# Patient Record
Sex: MALE | Race: ASIAN | NOT HISPANIC OR LATINO | Employment: OTHER | URBAN - METROPOLITAN AREA
[De-identification: names, ages, dates, MRNs, and addresses within clinical notes are randomized per-mention and may not be internally consistent; named-entity substitution may affect disease eponyms.]

---

## 2023-08-05 ENCOUNTER — HOSPITAL ENCOUNTER (INPATIENT)
Facility: MEDICAL CENTER | Age: 68
LOS: 2 days | DRG: 291 | End: 2023-08-07
Attending: EMERGENCY MEDICINE | Admitting: STUDENT IN AN ORGANIZED HEALTH CARE EDUCATION/TRAINING PROGRAM
Payer: COMMERCIAL

## 2023-08-05 ENCOUNTER — APPOINTMENT (OUTPATIENT)
Dept: RADIOLOGY | Facility: MEDICAL CENTER | Age: 68
DRG: 291 | End: 2023-08-05
Attending: EMERGENCY MEDICINE
Payer: COMMERCIAL

## 2023-08-05 PROBLEM — R79.89 ELEVATED BRAIN NATRIURETIC PEPTIDE (BNP) LEVEL: Status: ACTIVE | Noted: 2023-08-05

## 2023-08-05 PROBLEM — R00.1 BRADYCARDIA: Status: ACTIVE | Noted: 2023-08-05

## 2023-08-05 PROBLEM — D64.9 ANEMIA: Status: ACTIVE | Noted: 2023-08-05

## 2023-08-05 PROBLEM — I48.91 AF (ATRIAL FIBRILLATION) (HCC): Status: ACTIVE | Noted: 2023-08-05

## 2023-08-05 PROBLEM — R06.02 SHORTNESS OF BREATH: Status: ACTIVE | Noted: 2023-08-05

## 2023-08-05 PROBLEM — N18.9 CKD (CHRONIC KIDNEY DISEASE): Status: ACTIVE | Noted: 2023-08-05

## 2023-08-05 LAB
ALBUMIN SERPL BCP-MCNC: 4.1 G/DL (ref 3.2–4.9)
ALBUMIN/GLOB SERPL: 1.6 G/DL
ALP SERPL-CCNC: 87 U/L (ref 30–99)
ALT SERPL-CCNC: 26 U/L (ref 2–50)
ANION GAP SERPL CALC-SCNC: 9 MMOL/L (ref 7–16)
AST SERPL-CCNC: 27 U/L (ref 12–45)
BASOPHILS # BLD AUTO: 0.6 % (ref 0–1.8)
BASOPHILS # BLD: 0.04 K/UL (ref 0–0.12)
BILIRUB SERPL-MCNC: 0.6 MG/DL (ref 0.1–1.5)
BUN SERPL-MCNC: 27 MG/DL (ref 8–22)
CALCIUM ALBUM COR SERPL-MCNC: 8.9 MG/DL (ref 8.5–10.5)
CALCIUM SERPL-MCNC: 9 MG/DL (ref 8.5–10.5)
CHLORIDE SERPL-SCNC: 107 MMOL/L (ref 96–112)
CO2 SERPL-SCNC: 24 MMOL/L (ref 20–33)
CREAT SERPL-MCNC: 1.69 MG/DL (ref 0.5–1.4)
D DIMER PPP IA.FEU-MCNC: 0.87 UG/ML (FEU) (ref 0–0.5)
EKG IMPRESSION: NORMAL
EKG IMPRESSION: NORMAL
EOSINOPHIL # BLD AUTO: 0.2 K/UL (ref 0–0.51)
EOSINOPHIL NFR BLD: 2.9 % (ref 0–6.9)
ERYTHROCYTE [DISTWIDTH] IN BLOOD BY AUTOMATED COUNT: 42.6 FL (ref 35.9–50)
FLUAV RNA SPEC QL NAA+PROBE: NEGATIVE
FLUBV RNA SPEC QL NAA+PROBE: NEGATIVE
GFR SERPLBLD CREATININE-BSD FMLA CKD-EPI: 44 ML/MIN/1.73 M 2
GLOBULIN SER CALC-MCNC: 2.6 G/DL (ref 1.9–3.5)
GLUCOSE SERPL-MCNC: 94 MG/DL (ref 65–99)
HCT VFR BLD AUTO: 37.4 % (ref 42–52)
HGB BLD-MCNC: 12.6 G/DL (ref 14–18)
IMM GRANULOCYTES # BLD AUTO: 0.02 K/UL (ref 0–0.11)
IMM GRANULOCYTES NFR BLD AUTO: 0.3 % (ref 0–0.9)
LYMPHOCYTES # BLD AUTO: 1.86 K/UL (ref 1–4.8)
LYMPHOCYTES NFR BLD: 27.4 % (ref 22–41)
MCH RBC QN AUTO: 30.7 PG (ref 27–33)
MCHC RBC AUTO-ENTMCNC: 33.7 G/DL (ref 32.3–36.5)
MCV RBC AUTO: 91 FL (ref 81.4–97.8)
MONOCYTES # BLD AUTO: 0.59 K/UL (ref 0–0.85)
MONOCYTES NFR BLD AUTO: 8.7 % (ref 0–13.4)
NEUTROPHILS # BLD AUTO: 4.07 K/UL (ref 1.82–7.42)
NEUTROPHILS NFR BLD: 60.1 % (ref 44–72)
NRBC # BLD AUTO: 0 K/UL
NRBC BLD-RTO: 0 /100 WBC (ref 0–0.2)
NT-PROBNP SERPL IA-MCNC: 2923 PG/ML (ref 0–125)
PLATELET # BLD AUTO: 287 K/UL (ref 164–446)
PMV BLD AUTO: 8.5 FL (ref 9–12.9)
POTASSIUM SERPL-SCNC: 3.9 MMOL/L (ref 3.6–5.5)
PROT SERPL-MCNC: 6.7 G/DL (ref 6–8.2)
RBC # BLD AUTO: 4.11 M/UL (ref 4.7–6.1)
RSV RNA SPEC QL NAA+PROBE: NEGATIVE
SARS-COV-2 RNA RESP QL NAA+PROBE: NOTDETECTED
SODIUM SERPL-SCNC: 140 MMOL/L (ref 135–145)
SPECIMEN SOURCE: NORMAL
TROPONIN T SERPL-MCNC: 17 NG/L (ref 6–19)
TROPONIN T SERPL-MCNC: 17 NG/L (ref 6–19)
WBC # BLD AUTO: 6.8 K/UL (ref 4.8–10.8)

## 2023-08-05 PROCEDURE — 700111 HCHG RX REV CODE 636 W/ 250 OVERRIDE (IP)

## 2023-08-05 PROCEDURE — 770020 HCHG ROOM/CARE - TELE (206)

## 2023-08-05 PROCEDURE — 0241U HCHG SARS-COV-2 COVID-19 NFCT DS RESP RNA 4 TRGT MIC: CPT

## 2023-08-05 PROCEDURE — 93005 ELECTROCARDIOGRAM TRACING: CPT | Performed by: EMERGENCY MEDICINE

## 2023-08-05 PROCEDURE — 83880 ASSAY OF NATRIURETIC PEPTIDE: CPT

## 2023-08-05 PROCEDURE — 85025 COMPLETE CBC W/AUTO DIFF WBC: CPT

## 2023-08-05 PROCEDURE — C9803 HOPD COVID-19 SPEC COLLECT: HCPCS | Performed by: EMERGENCY MEDICINE

## 2023-08-05 PROCEDURE — 36415 COLL VENOUS BLD VENIPUNCTURE: CPT

## 2023-08-05 PROCEDURE — 85379 FIBRIN DEGRADATION QUANT: CPT

## 2023-08-05 PROCEDURE — 93005 ELECTROCARDIOGRAM TRACING: CPT

## 2023-08-05 PROCEDURE — 84484 ASSAY OF TROPONIN QUANT: CPT

## 2023-08-05 PROCEDURE — 80053 COMPREHEN METABOLIC PANEL: CPT

## 2023-08-05 PROCEDURE — 71275 CT ANGIOGRAPHY CHEST: CPT

## 2023-08-05 PROCEDURE — 99285 EMERGENCY DEPT VISIT HI MDM: CPT

## 2023-08-05 PROCEDURE — 99222 1ST HOSP IP/OBS MODERATE 55: CPT | Mod: GC | Performed by: STUDENT IN AN ORGANIZED HEALTH CARE EDUCATION/TRAINING PROGRAM

## 2023-08-05 PROCEDURE — 700117 HCHG RX CONTRAST REV CODE 255: Performed by: EMERGENCY MEDICINE

## 2023-08-05 PROCEDURE — A9270 NON-COVERED ITEM OR SERVICE: HCPCS

## 2023-08-05 PROCEDURE — 700102 HCHG RX REV CODE 250 W/ 637 OVERRIDE(OP)

## 2023-08-05 PROCEDURE — 71045 X-RAY EXAM CHEST 1 VIEW: CPT

## 2023-08-05 RX ORDER — OXYCODONE HYDROCHLORIDE 5 MG/1
5 TABLET ORAL EVERY 4 HOURS PRN
COMMUNITY

## 2023-08-05 RX ORDER — ATORVASTATIN CALCIUM 10 MG/1
10 TABLET, FILM COATED ORAL DAILY
Status: DISCONTINUED | OUTPATIENT
Start: 2023-08-06 | End: 2023-08-07 | Stop reason: HOSPADM

## 2023-08-05 RX ORDER — ACETAMINOPHEN 325 MG/1
650 TABLET ORAL EVERY 6 HOURS PRN
Status: DISCONTINUED | OUTPATIENT
Start: 2023-08-05 | End: 2023-08-07 | Stop reason: HOSPADM

## 2023-08-05 RX ORDER — HYDROXYZINE HYDROCHLORIDE 10 MG/1
10-20 TABLET, FILM COATED ORAL 3 TIMES DAILY PRN
Status: ON HOLD | COMMUNITY
End: 2023-08-07

## 2023-08-05 RX ORDER — HYDRALAZINE HYDROCHLORIDE 20 MG/ML
20 INJECTION INTRAMUSCULAR; INTRAVENOUS EVERY 6 HOURS PRN
Status: DISCONTINUED | OUTPATIENT
Start: 2023-08-05 | End: 2023-08-07 | Stop reason: HOSPADM

## 2023-08-05 RX ORDER — HYDROXYZINE HYDROCHLORIDE 25 MG/1
25 TABLET, FILM COATED ORAL 3 TIMES DAILY PRN
Status: DISCONTINUED | OUTPATIENT
Start: 2023-08-05 | End: 2023-08-07 | Stop reason: HOSPADM

## 2023-08-05 RX ORDER — HEPARIN SODIUM 5000 [USP'U]/ML
5000 INJECTION, SOLUTION INTRAVENOUS; SUBCUTANEOUS EVERY 8 HOURS
Status: DISCONTINUED | OUTPATIENT
Start: 2023-08-05 | End: 2023-08-07 | Stop reason: HOSPADM

## 2023-08-05 RX ORDER — NIFEDIPINE 30 MG/1
60 TABLET, EXTENDED RELEASE ORAL DAILY
Status: ON HOLD | COMMUNITY
End: 2023-08-07

## 2023-08-05 RX ORDER — POLYETHYLENE GLYCOL 3350 17 G/17G
1 POWDER, FOR SOLUTION ORAL
Status: DISCONTINUED | OUTPATIENT
Start: 2023-08-05 | End: 2023-08-07 | Stop reason: HOSPADM

## 2023-08-05 RX ORDER — AMOXICILLIN 250 MG
2 CAPSULE ORAL 2 TIMES DAILY
Status: DISCONTINUED | OUTPATIENT
Start: 2023-08-05 | End: 2023-08-07 | Stop reason: HOSPADM

## 2023-08-05 RX ORDER — BISACODYL 10 MG
10 SUPPOSITORY, RECTAL RECTAL
Status: DISCONTINUED | OUTPATIENT
Start: 2023-08-05 | End: 2023-08-07 | Stop reason: HOSPADM

## 2023-08-05 RX ORDER — ASPIRIN 81 MG/1
81 TABLET ORAL DAILY
Status: DISCONTINUED | OUTPATIENT
Start: 2023-08-06 | End: 2023-08-07 | Stop reason: HOSPADM

## 2023-08-05 RX ORDER — CHOLECALCIFEROL (VITAMIN D3) 125 MCG
5 CAPSULE ORAL NIGHTLY
Status: DISCONTINUED | OUTPATIENT
Start: 2023-08-05 | End: 2023-08-07 | Stop reason: HOSPADM

## 2023-08-05 RX ORDER — OXYCODONE HYDROCHLORIDE 5 MG/1
5 TABLET ORAL EVERY 4 HOURS PRN
Status: DISCONTINUED | OUTPATIENT
Start: 2023-08-05 | End: 2023-08-07 | Stop reason: HOSPADM

## 2023-08-05 RX ORDER — ASPIRIN 81 MG/1
81 TABLET ORAL DAILY
COMMUNITY

## 2023-08-05 RX ORDER — CLOPIDOGREL BISULFATE 75 MG/1
75 TABLET ORAL DAILY
Status: DISCONTINUED | OUTPATIENT
Start: 2023-08-06 | End: 2023-08-07 | Stop reason: HOSPADM

## 2023-08-05 RX ORDER — ATENOLOL 50 MG/1
50 TABLET ORAL DAILY
Status: ON HOLD | COMMUNITY
End: 2023-08-07

## 2023-08-05 RX ORDER — ENOXAPARIN SODIUM 100 MG/ML
40 INJECTION SUBCUTANEOUS DAILY
Status: DISCONTINUED | OUTPATIENT
Start: 2023-08-05 | End: 2023-08-05

## 2023-08-05 RX ORDER — ATORVASTATIN CALCIUM 10 MG/1
10 TABLET, FILM COATED ORAL DAILY
COMMUNITY

## 2023-08-05 RX ORDER — CLOPIDOGREL BISULFATE 75 MG/1
75 TABLET ORAL DAILY
COMMUNITY

## 2023-08-05 RX ORDER — FUROSEMIDE 10 MG/ML
40 INJECTION INTRAMUSCULAR; INTRAVENOUS ONCE
Status: COMPLETED | OUTPATIENT
Start: 2023-08-05 | End: 2023-08-05

## 2023-08-05 RX ADMIN — FUROSEMIDE 40 MG: 10 INJECTION INTRAMUSCULAR; INTRAVENOUS at 22:52

## 2023-08-05 RX ADMIN — Medication 5 MG: at 22:52

## 2023-08-05 RX ADMIN — SENNOSIDES AND DOCUSATE SODIUM 2 TABLET: 50; 8.6 TABLET ORAL at 20:31

## 2023-08-05 RX ADMIN — HYDROXYZINE HYDROCHLORIDE 25 MG: 25 TABLET, FILM COATED ORAL at 22:52

## 2023-08-05 RX ADMIN — IOHEXOL 100 ML: 350 INJECTION, SOLUTION INTRAVENOUS at 18:02

## 2023-08-05 RX ADMIN — HEPARIN SODIUM 5000 UNITS: 5000 INJECTION, SOLUTION INTRAVENOUS; SUBCUTANEOUS at 22:52

## 2023-08-05 ASSESSMENT — ENCOUNTER SYMPTOMS
HEMOPTYSIS: 0
CLAUDICATION: 0
DIARRHEA: 0
ORTHOPNEA: 1
BLURRED VISION: 0
SHORTNESS OF BREATH: 1
SPUTUM PRODUCTION: 0
WHEEZING: 0
ABDOMINAL PAIN: 0
SORE THROAT: 0
DIZZINESS: 1
DEPRESSION: 0
VOMITING: 0
COUGH: 0
FEVER: 0
DOUBLE VISION: 0
CHILLS: 0

## 2023-08-05 ASSESSMENT — LIFESTYLE VARIABLES
HAVE PEOPLE ANNOYED YOU BY CRITICIZING YOUR DRINKING: NO
HOW MANY TIMES IN THE PAST YEAR HAVE YOU HAD 5 OR MORE DRINKS IN A DAY: 0
TOTAL SCORE: 0
TOTAL SCORE: 0
AVERAGE NUMBER OF DAYS PER WEEK YOU HAVE A DRINK CONTAINING ALCOHOL: 0
ON A TYPICAL DAY WHEN YOU DRINK ALCOHOL HOW MANY DRINKS DO YOU HAVE: 0
DOES PATIENT WANT TO STOP DRINKING: NO
CONSUMPTION TOTAL: NEGATIVE
EVER HAD A DRINK FIRST THING IN THE MORNING TO STEADY YOUR NERVES TO GET RID OF A HANGOVER: NO
ALCOHOL_USE: NO
HAVE YOU EVER FELT YOU SHOULD CUT DOWN ON YOUR DRINKING: NO
EVER FELT BAD OR GUILTY ABOUT YOUR DRINKING: NO
TOTAL SCORE: 0

## 2023-08-05 ASSESSMENT — PAIN DESCRIPTION - PAIN TYPE: TYPE: ACUTE PAIN

## 2023-08-05 ASSESSMENT — PATIENT HEALTH QUESTIONNAIRE - PHQ9
2. FEELING DOWN, DEPRESSED, IRRITABLE, OR HOPELESS: NOT AT ALL
1. LITTLE INTEREST OR PLEASURE IN DOING THINGS: NOT AT ALL
SUM OF ALL RESPONSES TO PHQ9 QUESTIONS 1 AND 2: 0

## 2023-08-05 ASSESSMENT — FIBROSIS 4 INDEX
FIB4 SCORE: 1.2
FIB4 SCORE: 1.2

## 2023-08-05 NOTE — ED TRIAGE NOTES
Sree Horace Ángel  67 y.o.  Male  Chief Complaint   Patient presents with    Shortness of Breath     SOB x a few days + fatigue + orthopnea. Also c/o low L sided rib pain & bilateral leg swelling. No cough/cold sx. Visiting from [place], [place].   Hx a fib, rate currently controlled but irregular. On atenolol & nifedipine.      Patient educated on triage process, to alert staff if any changes in condition.    Labs ordered. EKG done on arrival.

## 2023-08-05 NOTE — ED PROVIDER NOTES
ED Provider Note    CHIEF COMPLAINT  Chief Complaint   Patient presents with    Shortness of Breath     SOB x a few days + fatigue + orthopnea. Also c/o low L sided rib pain & rib swelling. No cough/cold sx. Visiting from [place], [place].   Hx a fib, rate currently controlled but irregular. On atenolol & nifedipine.        EXTERNAL RECORDS REVIEWED  No notes for review    HPI/ROS    OUTSIDE HISTORIAN(S):  Family patient's wife at bedside adding to history review of systems patient does not have a history of heart failure and has had severe weakness and shortness of breath the last 24 to 40 hours.  They have been here before this altitude never this severe symptoms.    Sree Neal is a 67 y.o. male who presents this is a pleasant 67-year-old male who presents with shortness of breath as well as chest pain.  He is from [place] and states that he has been here multiple times he has been out in the heat.  He states when he walks he feels extremely winded and short of breath and has chest pain.  He has a history of bradycardia and is on atenolol currently and states his heart rates can go down to high 40s.  The patient's chest pain is dull in nature, no radiation to his neck, his back to his arms and he was seeing the area factors.  Denies history of blood clots.  In addition states the last few days he has severe swelling of his lower extremities which is new for him.  He also states he has a history of atrial fibrillation but does not take blood thinners but does take Plavix as well as aspirin.    PAST MEDICAL HISTORY       SURGICAL HISTORY  patient denies any surgical history    FAMILY HISTORY  No family history on file.    SOCIAL HISTORY  Social History     Tobacco Use    Smoking status: Not on file    Smokeless tobacco: Not on file   Substance and Sexual Activity    Alcohol use: Not on file    Drug use: Not on file    Sexual activity: Not on file       CURRENT MEDICATIONS  Home Medications    **Home  medications have not yet been reviewed for this encounter**         ALLERGIES  No Known Allergies    PHYSICAL EXAM  VITAL SIGNS: BP (!) 147/77   Pulse (!) 39   Temp 36.6 °C (97.9 °F) (Temporal)   Resp 14   Ht 1.829 m (6')   Wt 107 kg (235 lb 14.3 oz)   SpO2 94%   BMI 31.99 kg/m²        Nursing notes and vitals reviewed.  Constitutional: Well developed, Well nourished, No acute distress, Non-toxic appearance.   Eyes: PERRLA, EOMI, Conjunctiva normal, No discharge.   HENT: Normocephalic, Atraumatic, moist mucous membranes, no facial edema Cardiovascular: Normal heart rate, Normal rhythm, No murmurs, No rubs, No gallops.   Thorax & Lungs: No respiratory distress, No rales, No rhonchi, No wheezing, No chest tenderness.   Abdomen: Bowel sounds normal, Soft, No tenderness, No guarding, No rebound, No masses, No pulsatile masses.   Skin: Warm, Dry, No erythema, No rash.   Extremities: No deformity, nonpitting pedal edema laterally, good range of motion range of motion upper lower extremes bilaterally  Neurologic: Alert & oriented x 3, no focal abnormalities noted, acting appropriately on examination  Psychiatric: Affect normal for clinical presentation.      DIAGNOSTIC STUDIES / PROCEDURES  EKG  I have independently interpreted this EKG  Atrial fibrillation with bradycardia  LABS  Results for orders placed or performed during the hospital encounter of 08/05/23   CBC with Differential   Result Value Ref Range    WBC 6.8 4.8 - 10.8 K/uL    RBC 4.11 (L) 4.70 - 6.10 M/uL    Hemoglobin 12.6 (L) 14.0 - 18.0 g/dL    Hematocrit 37.4 (L) 42.0 - 52.0 %    MCV 91.0 81.4 - 97.8 fL    MCH 30.7 27.0 - 33.0 pg    MCHC 33.7 32.3 - 36.5 g/dL    RDW 42.6 35.9 - 50.0 fL    Platelet Count 287 164 - 446 K/uL    MPV 8.5 (L) 9.0 - 12.9 fL    Neutrophils-Polys 60.10 44.00 - 72.00 %    Lymphocytes 27.40 22.00 - 41.00 %    Monocytes 8.70 0.00 - 13.40 %    Eosinophils 2.90 0.00 - 6.90 %    Basophils 0.60 0.00 - 1.80 %    Immature Granulocytes  0.30 0.00 - 0.90 %    Nucleated RBC 0.00 0.00 - 0.20 /100 WBC    Neutrophils (Absolute) 4.07 1.82 - 7.42 K/uL    Lymphs (Absolute) 1.86 1.00 - 4.80 K/uL    Monos (Absolute) 0.59 0.00 - 0.85 K/uL    Eos (Absolute) 0.20 0.00 - 0.51 K/uL    Baso (Absolute) 0.04 0.00 - 0.12 K/uL    Immature Granulocytes (abs) 0.02 0.00 - 0.11 K/uL    NRBC (Absolute) 0.00 K/uL   Complete Metabolic Panel (CMP)   Result Value Ref Range    Sodium 140 135 - 145 mmol/L    Potassium 3.9 3.6 - 5.5 mmol/L    Chloride 107 96 - 112 mmol/L    Co2 24 20 - 33 mmol/L    Anion Gap 9.0 7.0 - 16.0    Glucose 94 65 - 99 mg/dL    Bun 27 (H) 8 - 22 mg/dL    Creatinine 1.69 (H) 0.50 - 1.40 mg/dL    Calcium 9.0 8.5 - 10.5 mg/dL    Correct Calcium 8.9 8.5 - 10.5 mg/dL    AST(SGOT) 27 12 - 45 U/L    ALT(SGPT) 26 2 - 50 U/L    Alkaline Phosphatase 87 30 - 99 U/L    Total Bilirubin 0.6 0.1 - 1.5 mg/dL    Albumin 4.1 3.2 - 4.9 g/dL    Total Protein 6.7 6.0 - 8.2 g/dL    Globulin 2.6 1.9 - 3.5 g/dL    A-G Ratio 1.6 g/dL   Troponins NOW   Result Value Ref Range    Troponin T 17 6 - 19 ng/L   Troponins in two (2) hours   Result Value Ref Range    Troponin T 17 6 - 19 ng/L   ESTIMATED GFR   Result Value Ref Range    GFR (CKD-EPI) 44 (A) >60 mL/min/1.73 m 2   CoV-2, FLU A/B, and RSV by PCR (2-4 Hours CEPHEID) : Collect NP swab in VTM    Specimen: Respirate   Result Value Ref Range    Influenza virus A RNA Negative Negative    Influenza virus B, PCR Negative Negative    RSV, PCR Negative Negative    SARS-CoV-2 by PCR NotDetected     SARS-CoV-2 Source NP Swab    D-DIMER   Result Value Ref Range    D-Dimer 0.87 (H) 0.00 - 0.50 ug/mL (FEU)   proBrain Natriuretic Peptide, NT   Result Value Ref Range    NT-proBNP 2923 (H) 0 - 125 pg/mL   EKG   Result Value Ref Range    Report       [facility]    Test Date:  2023-08-05  Pt Name:    WILMER KENNEDY                   Department: ER  MRN:        [id number]                      Room:  Gender:     Male                          Technician: EDSFHR  :        [id number]                   Requested By:ER TRIAGE PROTOCOL  Order #:    [id number]                    Reading MD: PAULINA KULKARNI DO    Measurements  Intervals                                Axis  Rate:       47                           P:          0  NJ:         0                            QRS:        57  QRSD:       109                          T:          28  QT:         490  QTc:        434    Interpretive Statements  Atrial fibrillation  No previous ECG available for comparison  Electronically Signed On 2023 15:52:44 PDT by PAULINA KULKARNI DO     EKG   Result Value Ref Range    Report       [facility] Emergency Dept.    Test Date:  2023  Pt Name:    WILMER KENNEDY                   Department: ER  MRN:        [id number]                      Room:  Gender:     Male                         Technician: [id number]  :        [id number]                   Requested By:ER TRIAGE PROTOCOL  Order #:    [id number]                    Reading MD:    Measurements  Intervals                                Axis  Rate:       78                           P:          0  NJ:         0                            QRS:        66  QRSD:       112                          T:          5  QT:         471  QTc:        537    Interpretive Statements  Atrial fibrillation  Borderline intraventricular conduction delay  No previous ECG available for comparison           RADIOLOGY  I have independently interpreted the diagnostic imaging associated with this visit and am waiting the final reading from the radiologist.   My preliminary interpretation is as follows: Chest x-ray is negative for infiltrate  Radiologist interpretation:   CT-CTA CHEST PULMONARY ARTERY W/ RECONS   Final Result      1.  No pulmonary embolus. No acute abnormality in the chest.   2.  Cardiomegaly.      DX-CHEST-PORTABLE (1 VIEW)   Final Result      Cardiomegaly.       EC-ECHOCARDIOGRAM COMPLETE W/O CONT    (Results Pending)         COURSE & MEDICAL DECISION MAKING    ED Observation Status? Yes; I am placing the patient in to an observation status due to a diagnostic uncertainty as well as therapeutic intensity. Patient placed in observation status at 3:52 PM, 8/5/2023.     Observation plan is as follows: EKG, troponin, blood work, chest x-ray reevaluation    Upon Reevaluation, the patient's condition has: not improved; and will be escalated to hospitalization.    Patient discharged from ED Observation status at 1630 (Time) 8/5/23 (Date).     INITIAL ASSESSMENT, COURSE AND PLAN  Care Narrative: This is a pleasant 67-year-old male who presents with shortness of breath, chest pain.  He has a heart score of 4 but no evidence of myocardial infarction.  Troponin is negative, EKG is showing bradycardia but no ST elevation.  The patient has an elevated BNP up to the 2000 range and does not have a history of heart failure as well as a swelling of his lower extremities.  I was concerned for possible pulmonary embolism secondary to his chest pain, shortness of breath, elevated BNP therefore CT was completed.  CTA pulm angiogram was negative following a D-dimer elevation.  Here in the emergency department, the patient had a heart rate go down to 35 bpm may be secondary to his beta-blockade yet I cannot completely exclude the very bradycardia.  Aldrich observation, prior echocardiogram and continues to be bradycardic will need cardiology consultation.  Did not need it currently.    CRITICAL CARE  The very real possibilty of a deterioration of this patient's condition required the highest level of my preparedness for sudden, emergent intervention.  I provided critical care services, which included medication orders, frequent reevaluations of the patient's condition and response to treatment, ordering and reviewing test results, and discussing the case with various consultants.  The critical  care time associated with the care of the patient was 45 minutes. Review chart for interventions. This time is exclusive of any other billable procedures.       DISPOSITION AND DISCUSSIONS  I have discussed management of the patient with the following physicians and ANGELA's: [facility] for hospitalization        FINAL DIAGNOSIS  Symptomatic bradycardia  Congestive heart failure  Chest pain  Critical care time 45 minutes  Electronically signed by: Tre Camacho D.O., 8/5/2023 3:41 PM

## 2023-08-05 NOTE — ED NOTES
Pt notes that his HR has been low recently in the 40's and he does not believe this is normal for him.

## 2023-08-06 ENCOUNTER — APPOINTMENT (OUTPATIENT)
Dept: CARDIOLOGY | Facility: MEDICAL CENTER | Age: 68
DRG: 291 | End: 2023-08-06
Attending: HOSPITALIST
Payer: COMMERCIAL

## 2023-08-06 LAB
ALBUMIN SERPL BCP-MCNC: 4.2 G/DL (ref 3.2–4.9)
ALBUMIN/GLOB SERPL: 1.5 G/DL
ALP SERPL-CCNC: 90 U/L (ref 30–99)
ALT SERPL-CCNC: 27 U/L (ref 2–50)
ANION GAP SERPL CALC-SCNC: 13 MMOL/L (ref 7–16)
AST SERPL-CCNC: 29 U/L (ref 12–45)
BASOPHILS # BLD AUTO: 0.6 % (ref 0–1.8)
BASOPHILS # BLD: 0.04 K/UL (ref 0–0.12)
BILIRUB SERPL-MCNC: 0.5 MG/DL (ref 0.1–1.5)
BUN SERPL-MCNC: 25 MG/DL (ref 8–22)
CALCIUM ALBUM COR SERPL-MCNC: 9 MG/DL (ref 8.5–10.5)
CALCIUM SERPL-MCNC: 9.2 MG/DL (ref 8.5–10.5)
CHLORIDE SERPL-SCNC: 106 MMOL/L (ref 96–112)
CO2 SERPL-SCNC: 26 MMOL/L (ref 20–33)
CREAT SERPL-MCNC: 1.41 MG/DL (ref 0.5–1.4)
EOSINOPHIL # BLD AUTO: 0.25 K/UL (ref 0–0.51)
EOSINOPHIL NFR BLD: 3.9 % (ref 0–6.9)
ERYTHROCYTE [DISTWIDTH] IN BLOOD BY AUTOMATED COUNT: 42.3 FL (ref 35.9–50)
GFR SERPLBLD CREATININE-BSD FMLA CKD-EPI: 55 ML/MIN/1.73 M 2
GLOBULIN SER CALC-MCNC: 2.8 G/DL (ref 1.9–3.5)
GLUCOSE SERPL-MCNC: 134 MG/DL (ref 65–99)
HCT VFR BLD AUTO: 40.7 % (ref 42–52)
HGB BLD-MCNC: 13.9 G/DL (ref 14–18)
IMM GRANULOCYTES # BLD AUTO: 0.03 K/UL (ref 0–0.11)
IMM GRANULOCYTES NFR BLD AUTO: 0.5 % (ref 0–0.9)
LV EJECT FRACT  99904: 50
LV EJECT FRACT MOD 2C 99903: 52.52
LV EJECT FRACT MOD 4C 99902: 49.94
LV EJECT FRACT MOD BP 99901: 52.72
LYMPHOCYTES # BLD AUTO: 1.81 K/UL (ref 1–4.8)
LYMPHOCYTES NFR BLD: 28.3 % (ref 22–41)
MAGNESIUM SERPL-MCNC: 2.1 MG/DL (ref 1.5–2.5)
MCH RBC QN AUTO: 30.6 PG (ref 27–33)
MCHC RBC AUTO-ENTMCNC: 34.2 G/DL (ref 32.3–36.5)
MCV RBC AUTO: 89.6 FL (ref 81.4–97.8)
MONOCYTES # BLD AUTO: 0.48 K/UL (ref 0–0.85)
MONOCYTES NFR BLD AUTO: 7.5 % (ref 0–13.4)
NEUTROPHILS # BLD AUTO: 3.78 K/UL (ref 1.82–7.42)
NEUTROPHILS NFR BLD: 59.2 % (ref 44–72)
NRBC # BLD AUTO: 0 K/UL
NRBC BLD-RTO: 0 /100 WBC (ref 0–0.2)
PLATELET # BLD AUTO: 313 K/UL (ref 164–446)
PMV BLD AUTO: 8.5 FL (ref 9–12.9)
POTASSIUM SERPL-SCNC: 3.2 MMOL/L (ref 3.6–5.5)
PROT SERPL-MCNC: 7 G/DL (ref 6–8.2)
RBC # BLD AUTO: 4.54 M/UL (ref 4.7–6.1)
SODIUM SERPL-SCNC: 145 MMOL/L (ref 135–145)
WBC # BLD AUTO: 6.4 K/UL (ref 4.8–10.8)

## 2023-08-06 PROCEDURE — 700102 HCHG RX REV CODE 250 W/ 637 OVERRIDE(OP)

## 2023-08-06 PROCEDURE — 93306 TTE W/DOPPLER COMPLETE: CPT

## 2023-08-06 PROCEDURE — 83735 ASSAY OF MAGNESIUM: CPT

## 2023-08-06 PROCEDURE — 700111 HCHG RX REV CODE 636 W/ 250 OVERRIDE (IP): Mod: JZ | Performed by: HOSPITALIST

## 2023-08-06 PROCEDURE — 770020 HCHG ROOM/CARE - TELE (206)

## 2023-08-06 PROCEDURE — 85025 COMPLETE CBC W/AUTO DIFF WBC: CPT

## 2023-08-06 PROCEDURE — A9270 NON-COVERED ITEM OR SERVICE: HCPCS | Mod: JZ | Performed by: HOSPITALIST

## 2023-08-06 PROCEDURE — 700102 HCHG RX REV CODE 250 W/ 637 OVERRIDE(OP): Mod: JZ | Performed by: HOSPITALIST

## 2023-08-06 PROCEDURE — A9270 NON-COVERED ITEM OR SERVICE: HCPCS

## 2023-08-06 PROCEDURE — 700111 HCHG RX REV CODE 636 W/ 250 OVERRIDE (IP)

## 2023-08-06 PROCEDURE — 93306 TTE W/DOPPLER COMPLETE: CPT | Mod: 26 | Performed by: INTERNAL MEDICINE

## 2023-08-06 PROCEDURE — 99233 SBSQ HOSP IP/OBS HIGH 50: CPT | Mod: GC | Performed by: STUDENT IN AN ORGANIZED HEALTH CARE EDUCATION/TRAINING PROGRAM

## 2023-08-06 PROCEDURE — 80053 COMPREHEN METABOLIC PANEL: CPT

## 2023-08-06 RX ORDER — CARVEDILOL 3.12 MG/1
3.12 TABLET ORAL 2 TIMES DAILY WITH MEALS
Status: DISCONTINUED | OUTPATIENT
Start: 2023-08-06 | End: 2023-08-07 | Stop reason: HOSPADM

## 2023-08-06 RX ORDER — POTASSIUM CHLORIDE 20 MEQ/1
40 TABLET, EXTENDED RELEASE ORAL ONCE
Status: COMPLETED | OUTPATIENT
Start: 2023-08-06 | End: 2023-08-06

## 2023-08-06 RX ORDER — FUROSEMIDE 10 MG/ML
40 INJECTION INTRAMUSCULAR; INTRAVENOUS
Status: COMPLETED | OUTPATIENT
Start: 2023-08-06 | End: 2023-08-06

## 2023-08-06 RX ADMIN — FUROSEMIDE 40 MG: 10 INJECTION INTRAMUSCULAR; INTRAVENOUS at 11:36

## 2023-08-06 RX ADMIN — POTASSIUM CHLORIDE 40 MEQ: 1500 TABLET, EXTENDED RELEASE ORAL at 08:10

## 2023-08-06 RX ADMIN — Medication 5 MG: at 21:13

## 2023-08-06 RX ADMIN — OXYCODONE HYDROCHLORIDE 5 MG: 5 TABLET ORAL at 17:57

## 2023-08-06 RX ADMIN — CARVEDILOL 3.12 MG: 3.12 TABLET, FILM COATED ORAL at 17:57

## 2023-08-06 RX ADMIN — FUROSEMIDE 40 MG: 10 INJECTION INTRAMUSCULAR; INTRAVENOUS at 17:58

## 2023-08-06 RX ADMIN — HEPARIN SODIUM 5000 UNITS: 5000 INJECTION, SOLUTION INTRAVENOUS; SUBCUTANEOUS at 05:19

## 2023-08-06 RX ADMIN — HEPARIN SODIUM 5000 UNITS: 5000 INJECTION, SOLUTION INTRAVENOUS; SUBCUTANEOUS at 14:08

## 2023-08-06 RX ADMIN — CLOPIDOGREL BISULFATE 75 MG: 75 TABLET ORAL at 05:19

## 2023-08-06 RX ADMIN — POTASSIUM CHLORIDE 40 MEQ: 1500 TABLET, EXTENDED RELEASE ORAL at 11:37

## 2023-08-06 RX ADMIN — SENNOSIDES AND DOCUSATE SODIUM 2 TABLET: 50; 8.6 TABLET ORAL at 05:19

## 2023-08-06 RX ADMIN — ASPIRIN 81 MG: 81 TABLET, COATED ORAL at 05:19

## 2023-08-06 RX ADMIN — ATORVASTATIN CALCIUM 10 MG: 10 TABLET, FILM COATED ORAL at 05:19

## 2023-08-06 RX ADMIN — OXYCODONE HYDROCHLORIDE 5 MG: 5 TABLET ORAL at 22:12

## 2023-08-06 ASSESSMENT — ENCOUNTER SYMPTOMS
SHORTNESS OF BREATH: 0
DOUBLE VISION: 0
ORTHOPNEA: 1
NAUSEA: 1
BACK PAIN: 0
ABDOMINAL PAIN: 0

## 2023-08-06 ASSESSMENT — PAIN DESCRIPTION - PAIN TYPE
TYPE: ACUTE PAIN
TYPE: CHRONIC PAIN

## 2023-08-06 ASSESSMENT — FIBROSIS 4 INDEX: FIB4 SCORE: 1.16

## 2023-08-06 NOTE — ASSESSMENT & PLAN NOTE
Noted to have a creatinine of 1.69.  Patient does admit to a history of CKD, is being followed by outpatient nephrology.

## 2023-08-06 NOTE — ASSESSMENT & PLAN NOTE
Noted to have a hemoglobin of 12.6.  Denies any signs of active bleeding, denies any bloody bowel movements at this time.  Daily CBCs

## 2023-08-06 NOTE — ED NOTES
Assumed care of patient, bedside report received from JANINA Campa.  Introduced self as pt RN, POC discussed, call light in reach, pt on room air at this time.

## 2023-08-06 NOTE — PROGRESS NOTES
[facility] Internal Medicine Daily Progress Note    Date of Service  8/6/2023    [facility] Team: R IM White Team   Attending: Jasvir Mendoza M.d.  Senior Resident: Dr. Garcias  Contact Number: [phone]    Chief Complaint  Sree Neal is a 67 y.o. male admitted 8/5/2023 with shortness of breath, orthopnea, and lower extremity leg swelling.    Hospital Course  No notes on file    Subjective:  Patient reports improvement in lower extremity swelling with diuresis.  Patient accompanied by wife at bedside.  Both parties report the patient has had a history of intermittent bradycardia since the beginning of this year at least.  Patient denies any lightheadedness or dizziness with heart rates in the 50s (as they were overnight).  Patient reports that he was told that he likely has sleep apnea, he has not been officially tested.    Interval Problem Update  Appropriate saturations well on room air.  Patient pending echocardiogram, will continue diuresis in the interim.  We will reinitiate patient's beta-blocker, but will substitute the Tylenol for carvedilol for improved blood pressure support.  Will initiate at 2 g sodium limit and 1.5 L fluid restriction.    I have discussed this patient's plan of care and discharge plan at IDT rounds today with Case Management, Nursing, Nursing leadership, and other members of the IDT team.    Consultants/Specialty  N/A    Code Status  Full Code    Disposition  The patient is not medically cleared for discharge to home or a post-acute facility.      I have placed the appropriate orders for post-discharge needs.    Review of Systems  Review of Systems   Eyes:  Negative for double vision.   Respiratory:  Negative for shortness of breath.    Cardiovascular:  Positive for orthopnea and leg swelling. Negative for chest pain.   Gastrointestinal:  Positive for nausea. Negative for abdominal pain.   Genitourinary:  Negative for dysuria.   Musculoskeletal:  Negative for back pain.        Physical  Exam  Temp:  [36.4 °C (97.5 °F)-37 °C (98.6 °F)] 36.7 °C (98.1 °F)  Pulse:  [43-58] 48  Resp:  [16] 16  BP: (107-164)/(61-96) 128/61  SpO2:  [91 %-98 %] 98 %    Physical Exam  Constitutional:       General: He is not in acute distress.     Appearance: He is not toxic-appearing.   HENT:      Head: Normocephalic and atraumatic.      Nose: No rhinorrhea.   Eyes:      General: No scleral icterus.        Right eye: No discharge.         Left eye: No discharge.      Extraocular Movements: Extraocular movements intact.   Cardiovascular:      Rate and Rhythm: Normal rate.      Pulses: Normal pulses.   Pulmonary:      Effort: No respiratory distress.      Breath sounds: No stridor.   Abdominal:      Tenderness: There is no abdominal tenderness. There is no guarding.   Musculoskeletal:      Cervical back: Normal range of motion. No rigidity.      Right lower leg: No edema.      Left lower leg: No edema.   Skin:     Coloration: Skin is not jaundiced or pale.   Neurological:      Mental Status: Mental status is at baseline.   Psychiatric:         Mood and Affect: Mood normal.         Fluids    Intake/Output Summary (Last 24 hours) at 8/6/2023 1519  Last data filed at 8/6/2023 1300  Gross per 24 hour   Intake 1220 ml   Output 4450 ml   Net -3230 ml       Laboratory  Recent Labs     08/05/23  1420 08/06/23  0036   WBC 6.8 6.4   RBC 4.11* 4.54*   HEMOGLOBIN 12.6* 13.9*   HEMATOCRIT 37.4* 40.7*   MCV 91.0 89.6   MCH 30.7 30.6   MCHC 33.7 34.2   RDW 42.6 42.3   PLATELETCT 287 313   MPV 8.5* 8.5*     Recent Labs     08/05/23  1420 08/06/23  0036   SODIUM 140 145   POTASSIUM 3.9 3.2*   CHLORIDE 107 106   CO2 24 26   GLUCOSE 94 134*   BUN 27* 25*   CREATININE 1.69* 1.41*   CALCIUM 9.0 9.2                   Imaging  EC-ECHOCARDIOGRAM COMPLETE W/O CONT         CT-CTA CHEST PULMONARY ARTERY W/ RECONS   Final Result      1.  No pulmonary embolus. No acute abnormality in the chest.   2.  Cardiomegaly.      DX-CHEST-PORTABLE (1 VIEW)   Final  Result      Cardiomegaly.           Assessment/Plan  Problem Representation:    * Bradycardia- (present on admission)  Assessment & Plan  Admits to episodes of dizziness upon arrival.  Patient with heart rates in the 50s overnight, denies any lightheadedness or dizziness, and he denies any syncopal episodes  We will stop home atenolol and substituted for carvedilol with holding parameters (hold for heart rate less than 60, SBP less than 100) hold home medications   Telemetry     Anemia  Assessment & Plan  Noted to have a hemoglobin of 12.6.  Denies any signs of active bleeding, denies any bloody bowel movements at this time.  Daily CBCs    Elevated brain natriuretic peptide (BNP) level  Assessment & Plan  Noted to have an elevated proBNP of 2923, possibly secondary to heart failure, however the patient's previous echocardiogram in February was unremarkable, did not show any diastolic or systolic dysfunction, normal ejection fraction.  -Repeat echocardiogram    AF (atrial fibrillation) ([facility])  Assessment & Plan  Status post watchman placement on 4/27/2023  -Continue home aspirin 81mg  -Continue home atenolol, and substitution for carvedilol; hold nifedipine due to concerns of symptomatic bradycardia      Shortness of breath  Assessment & Plan  Patient presented with sudden onset of shortness of breath which has been going on for 2 days after traveling from [place] to [place] (biplane).  Denies any fevers, chills, cough, denies any asthma or smoking history.  No white blood cell count present, likely not of infectious etiology.  CT PE in the ED was negative for any pulmonary consolidations, pulmonary embolism, pleural effusions, infiltrates.  Patient proBNP was elevated at 2923, patient does have significant pitting edema in his lower extremity. Patient is not requiring oxygen currently.  Previously did have echo in April which did not show any signs of systolic or diastolic dysfunction, or valvular dysfunction. Possible  new onset heart failure versus altitude sickness.  Resolved, patient with appropriate saturations on room air  -Telemetry  -Trial of IV Lasix  -Strict ins and outs  -Supplemental oxygen as needed  -Salt restriction  -Echocardiogram  -Consider dexamethasone if symptoms are not improving for altitude sickness           VTE prophylaxis: heparin ppx    I have performed a physical exam and reviewed and updated ROS and Plan today (8/6/2023). In review of yesterday's note (8/5/2023), there are no changes except as documented above.    Code Status: FULL  DVT prophylaxis: Heparin   Diet: Cardiac diet, 2 g sodium restriction, 1.5 L fluid restriction  GI: Bowel protocol in place   Disposition: Discharge pending echocardiogram, will continue diuresis in the interim.     Beba Garcias MD MPH  PGY-3  [facility]

## 2023-08-06 NOTE — ASSESSMENT & PLAN NOTE
Admits to episodes of dizziness upon arrival.  Patient with heart rates in the 50s overnight, denies any lightheadedness or dizziness, and he denies any syncopal episodes  We will stop home atenolol and substituted for carvedilol with holding parameters (hold for heart rate less than 60, SBP less than 100) hold home medications   Telemetry

## 2023-08-06 NOTE — ASSESSMENT & PLAN NOTE
Patient presented with sudden onset of shortness of breath which has been going on for 2 days after traveling from [place] to [place].  Denies any fevers, chills, cough, denies any asthma or smoking history.  No white blood cell count present, likely not of infectious etiology.  CT PE in the ED was negative for any pulmonary consolidations, pulmonary embolism, pleural effusions, infiltrates.  Patient proBNP was elevated at 2923, patient does have significant pitting edema in his lower extremity. Patient is not requiring oxygen currently.  Previously did have MAURICIO in June which did not show any signs of systolic or diastolic dysfunction, or significant  valvular dysfunction, or wall motion abnormalities. Possible new onset heart failure versus symptomatic bradycardia    -Telemetry  -Trial of IV Lasix  -Strict ins and outs  -Supplemental oxygen as needed  -Salt restriction  -Echocardiogram  -Consider dexamethasone if symptoms are not improving for altitude sickness

## 2023-08-06 NOTE — ASSESSMENT & PLAN NOTE
Patient presented with sudden onset of shortness of breath which has been going on for 2 days after traveling from [place] to [place] ([facility]).  Denies any fevers, chills, cough, denies any asthma or smoking history.  No white blood cell count present, likely not of infectious etiology.  CT PE in the ED was negative for any pulmonary consolidations, pulmonary embolism, pleural effusions, infiltrates.  Patient proBNP was elevated at 2923, patient does have significant pitting edema in his lower extremity. Patient is not requiring oxygen currently.  Previously did have echo in April which did not show any signs of systolic or diastolic dysfunction, or valvular dysfunction. Possible new onset heart failure versus altitude sickness.  Resolved, patient with appropriate saturations on room air  -Telemetry  -Trial of IV Lasix  -Strict ins and outs  -Supplemental oxygen as needed  -Salt restriction  -Echocardiogram  -Consider dexamethasone if symptoms are not improving for altitude sickness

## 2023-08-06 NOTE — ASSESSMENT & PLAN NOTE
Noted to have a hemoglobin of 12.6.  Denies any signs of active bleeding, denies any bloody bowel movements at this time.  Daily CBCs   urinary symptoms

## 2023-08-06 NOTE — ASSESSMENT & PLAN NOTE
Noted to have an elevated proBNP of 2923, Previously did have MAURICIO in June which did not show any signs of systolic or diastolic dysfunction, or significant  valvular dysfunction, or wall motion abnormalities. Patient does also have CKD which could lead to a falsely elevated number.  -Repeat echocardiogram

## 2023-08-06 NOTE — PROGRESS NOTES
Bedside report received from night RN, pt care assumed. Pt is resting comfortably, A-fib and bradycardic on the monitor. Bed in lowest, locked position, treaded socks on, call light and belongings within reach.

## 2023-08-06 NOTE — H&P
[facility] Internal Medicine History & Physical Note    Date of Service  8/5/2023    Attending: Parrish Eduardo M.d.  Senior Resident: Dr. Davis  Contact Number: [phone]    Primary Care Physician  Pcp Pt States None    Consultants  Code Status  Full Code    Chief Complaint  Chief Complaint   Patient presents with    Shortness of Breath     SOB x a few days + fatigue + orthopnea. Also c/o low L sided rib pain & bilateral leg swelling. No cough/cold sx. Visiting from [place], [place].   Hx a fib, rate currently controlled but irregular. On atenolol & nifedipine.        History of Presenting Illness (HPI):   Sree Neal is a 67 y.o. male with a past medical history of chronic kidney disease, atrial fibrillation status post Watchman Placement in April, hypertension, chronic brain who presented 8/5/2023 with 2 days of shortness of breath.  Patient states that he arrived from [place] on August 4 for hot August nights.  Patient states that he flew from [place].  Patient noticed that he was becoming increasingly short of breath which would worsen when he walks and relieves when he rests, denies any chest pain.  Patient denies any fevers or chills or cough, recent sick contacts.  Patient does state his symptoms did worsen when he was laying down flat in his bed last night and felt increasing shortness of breath.  Patient also states he has been noticing his lower extremities have been more swollen.  Patient also states that he has been noticing he has been getting lightheaded and dizzy from time to time however denies any syncopal episodes patient states he has previously been to [place] and has had not any similar symptoms in the past.  Patient states he has been compliant with his medications, denies any history of blood clots, denies any smoking history or alcohol use.    In the ED patient was found to have a blood pressure 139/85, respiratory rate of 14, pulse of 50, afebrile, saturating 95% on room air.  Labs revealed  a within normal limits troponin, mildly elevated D-dimer, CBC with a hemoglobin of 12.6, CHEM panel with a creatinine of 1.69, proBNP of 2923.  EKG showed atrial fibrillation with a rate of 47.  CTA was negative for pulmonary embolism, did not show any consolidations or pleural effusions.    I discussed the plan of care with patient.    Review of Systems  Review of Systems   Constitutional:  Negative for chills and fever.   HENT:  Negative for sore throat.    Eyes:  Negative for blurred vision and double vision.   Respiratory:  Positive for shortness of breath. Negative for cough, hemoptysis, sputum production and wheezing.    Cardiovascular:  Positive for orthopnea and leg swelling. Negative for claudication.   Gastrointestinal:  Negative for abdominal pain, diarrhea and vomiting.   Genitourinary:  Negative for dysuria and urgency.   Neurological:  Positive for dizziness.   Psychiatric/Behavioral:  Negative for depression.        Past Medical History      Surgical History       Family History     Family history reviewed with patient.     Social History  Tobacco: Denies  Alcohol: Denies  Recreational drugs (illegal or prescription): Denies  Employment:   Living Situation: Lives in [place]  Recent Travel: Traveled from [place] to [place]  Primary Care Provider: Not Reviewed  Other (stressors, spirituality, exposures): Denies    Allergies  No Known Allergies    Medications  Prior to Admission Medications   Prescriptions Last Dose Informant Patient Reported? Taking?   NIFEdipine SR (PROCADIA-XL) 30 MG tablet 8/5/2023 at 0900 Family Member Yes Yes   Sig: Take 60 mg by mouth every day. 30 mg x 2 = 60 mg   aspirin 81 MG EC tablet 8/5/2023 at 0900 Family Member Yes Yes   Sig: Take 81 mg by mouth every day.   atenolol (TENORMIN) 50 MG Tab 8/5/2023 at 0900 Family Member Yes Yes   Sig: Take 50 mg by mouth every day.   atorvastatin (LIPITOR) 10 MG Tab 8/5/2023 at 0900 Family Member Yes Yes   Sig: Take 10 mg by mouth every day.    clopidogrel (PLAVIX) 75 MG Tab 8/5/2023 at 0900 Family Member Yes Yes   Sig: Take 75 mg by mouth every day.   hydrOXYzine HCl (ATARAX) 10 MG Tab 8/5/2023 at 1330 Family Member Yes Yes   Sig: Take 10-20 mg by mouth 3 times a day as needed for Anxiety.   oxyCODONE immediate-release (ROXICODONE) 5 MG Tab 8/5/2023 at 1330 Family Member Yes Yes   Sig: Take 5 mg by mouth every four hours as needed for Severe Pain.      Facility-Administered Medications: None       Physical Exam  Temp:  [36.6 °C (97.9 °F)] 36.6 °C (97.9 °F)  Pulse:  [39-51] 51  Resp:  [14-16] 16  BP: (139-164)/(77-96) 164/96  SpO2:  [94 %-98 %] 97 %  Blood Pressure : (!) 147/77   Temperature: 36.6 °C (97.9 °F)   Pulse: (!) 39   Respiration: 14   Pulse Oximetry: 94 %       Physical Exam  Constitutional:       Appearance: Normal appearance.   HENT:      Head: Normocephalic and atraumatic.      Mouth/Throat:      Mouth: Mucous membranes are moist.      Pharynx: Oropharynx is clear.   Eyes:      General:         Right eye: No discharge.         Left eye: No discharge.      Extraocular Movements: Extraocular movements intact.      Conjunctiva/sclera: Conjunctivae normal.      Pupils: Pupils are equal, round, and reactive to light.   Cardiovascular:      Rate and Rhythm: Bradycardia present. Rhythm irregular.      Heart sounds: No murmur heard.  Pulmonary:      Effort: Pulmonary effort is normal. No respiratory distress.      Breath sounds: Normal breath sounds. No wheezing or rales.   Abdominal:      General: Abdomen is flat. There is no distension.      Palpations: Abdomen is soft.      Tenderness: There is no abdominal tenderness.   Musculoskeletal:         General: Swelling present.      Right lower leg: Edema present.      Left lower leg: Edema present.   Skin:     General: Skin is warm and dry.   Neurological:      General: No focal deficit present.      Mental Status: He is alert and oriented to person, place, and time.   Psychiatric:         Mood and  Affect: Mood normal.         Behavior: Behavior normal.         Laboratory:  Recent Labs     08/05/23  1420   WBC 6.8   RBC 4.11*   HEMOGLOBIN 12.6*   HEMATOCRIT 37.4*   MCV 91.0   MCH 30.7   MCHC 33.7   RDW 42.6   PLATELETCT 287   MPV 8.5*     Recent Labs     08/05/23  1420   SODIUM 140   POTASSIUM 3.9   CHLORIDE 107   CO2 24   GLUCOSE 94   BUN 27*   CREATININE 1.69*   CALCIUM 9.0     Recent Labs     08/05/23  1420   ALTSGPT 26   ASTSGOT 27   ALKPHOSPHAT 87   TBILIRUBIN 0.6   GLUCOSE 94         Recent Labs     08/05/23  1420   NTPROBNP 2923*         Recent Labs     08/05/23  1420 08/05/23  1621   TROPONINT 17 17       Imaging:  CT-CTA CHEST PULMONARY ARTERY W/ RECONS   Final Result      1.  No pulmonary embolus. No acute abnormality in the chest.   2.  Cardiomegaly.      DX-CHEST-PORTABLE (1 VIEW)   Final Result      Cardiomegaly.      EC-ECHOCARDIOGRAM COMPLETE W/O CONT    (Results Pending)       X-Ray:  I have personally reviewed the images and compared with prior images.  EKG:  I have personally reviewed the images and compared with prior images.    Assessment/Plan:  Problem Representation: Patient initially presented with 2 days of increasing shortness of breath, increasing swelling in his extremities, with patient's history of atrial fibrillation, and uncontrolled hypertension could point to a diagnosis of decompensated heart failure.  I anticipate this patient will require at least two midnights for appropriate medical management, necessitating inpatient admission because possible new onset heart failure requiring diuresis    Patient will need a Telemetry bed on MEDICAL service .  The need is secondary to symptomatic bradycardia, possible new onset heart failure.    * Bradycardia- (present on admission)  Assessment & Plan  Admits to episodes of dizziness, denies any syncopal episodes  Secondary to Atenolol and Nifedipine  Hold home medications   Telemetry     Shortness of breath  Assessment & Plan  Patient  presented with sudden onset of shortness of breath which has been going on for 2 days after traveling from [place] to [place].  Denies any fevers, chills, cough, denies any asthma or smoking history.  No white blood cell count present, likely not of infectious etiology.  CT PE in the ED was negative for any pulmonary consolidations, pulmonary embolism, pleural effusions, infiltrates.  Patient proBNP was elevated at 2923, patient does have significant pitting edema in his lower extremity. Patient is not requiring oxygen currently.  Previously did have MAURICIO in June which did not show any signs of systolic or diastolic dysfunction, or significant  valvular dysfunction, or wall motion abnormalities. Possible new onset heart failure versus symptomatic bradycardia    -Telemetry  -Trial of IV Lasix  -Strict ins and outs  -Supplemental oxygen as needed  -Salt restriction  -Echocardiogram  -Consider dexamethasone if symptoms are not improving for altitude sickness      CKD (chronic kidney disease)  Assessment & Plan  Noted to have a creatinine of 1.69.  Patient does admit to a history of CKD, is being followed by outpatient nephrology.    Anemia  Assessment & Plan  Noted to have a hemoglobin of 12.6.  Denies any signs of active bleeding, denies any bloody bowel movements at this time.  Daily CBCs    Elevated brain natriuretic peptide (BNP) level  Assessment & Plan  Noted to have an elevated proBNP of 2923, Previously did have MAURICIO in June which did not show any signs of systolic or diastolic dysfunction, or significant  valvular dysfunction, or wall motion abnormalities. Patient does also have CKD which could lead to a falsely elevated number.  -Repeat echocardiogram    AF (atrial fibrillation) ([facility])  Assessment & Plan  Status post watchman placement on 4/27/2023  -Continue home aspirin 81mg, continue plavix 75mg (refer to outside note from cardiology)  -Hold home atenolol and nifedipine due to concerns of symptomatic  bradycardia          VTE prophylaxis: heparin ppx

## 2023-08-06 NOTE — ASSESSMENT & PLAN NOTE
Noted to have an elevated proBNP of 2923, possibly secondary to heart failure, however the patient's previous echocardiogram in February was unremarkable, did not show any diastolic or systolic dysfunction, normal ejection fraction.  -Repeat echocardiogram

## 2023-08-06 NOTE — PROGRESS NOTES
Monitor Summary:  Rhythm: Afib Rate: 46-51  Ectopies: PVC, trigeminy, couplet  Measurement: ./.10/.46

## 2023-08-06 NOTE — ASSESSMENT & PLAN NOTE
Status post watchman placement on 4/27/2023  -Continue home aspirin 81mg  -Continue home atenolol, and substitution for carvedilol; hold nifedipine due to concerns of symptomatic bradycardia

## 2023-08-06 NOTE — ASSESSMENT & PLAN NOTE
Status post watchman placement on 4/27/2023  -Continue home aspirin 81mg, continue plavix 75mg (refer to outside note from cardiology)  -Hold home atenolol and nifedipine due to concerns of symptomatic bradycardia

## 2023-08-06 NOTE — PROGRESS NOTES
This note is intended for the purposes of medical student education and feedback only.   Please refer to the documentation by this patient's assigned medical practitioner for details of care and plans.    Medical Student Progress Note  Note Author: Pamela Foster, Student  Date: 8/6/2023    Date of Admission: 8/5/2023  Primary Team: [facility]  Attending: Dr. Mendoza  Senior Resident: Dr. Garcais  Medical Student: Pamela Foster MS4    ID/CC: Sree Neal is a 67 y.o. male with a PMH of stage 3a CKD and atrial fibrillation s/p watchman placement in April who was admitted on 8/5/2023 with shortness of breath, orthopnea, and lower extremity swelling.    INTERVAL UPDATE FOR 8/6/2023  Patient is being followed for possible new acute onset heart failure. Pending echocardiogram results.    OBJECTIVE   Physical Exam:  /61   Pulse (!) 48   Temp 36.7 °C (98.1 °F) (Temporal)   Resp 16   Ht 1.829 m (6')   Wt 103 kg (227 lb 8.2 oz)   SpO2 98%     Intake/Output Summary (Last 24 hours) at 8/6/2023 1400  Last data filed at 8/6/2023 1300  Gross per 24 hour   Intake 1220 ml   Output 4450 ml   Net -3230 ml       General: Well developed, well nourished, male, appears stated age, appears to be in no acute distress.  HEENT: Normocephalic, atraumatic. EOM grossly intact, PERRLA. Mucous membranes moist. No lymphadenopathy.  Cardio: Normal S1 and S2. Irregular rhythm. No murmurs, rubs, or gallops. No JVD.  Pulmonary: Lungs are clear to auscultation bilaterally. No wheezes, rales, or rhonchi.  Abdomen: Normoactive bowel sounds. Abdomen is soft and nondistended. No masses. No tenderness to palpation in all four quadrants.   MSK: Normal ROM. Extremities well-perfused. Capillary refill <2 seconds. No LE edema.  Psych: Appropriate mood and affect.    Lab Results:  Recent Labs     08/05/23  1420 08/06/23  0036   WBC 6.8 6.4   RBC 4.11* 4.54*   HEMOGLOBIN 12.6* 13.9*   HEMATOCRIT 37.4* 40.7*   MCV 91.0 89.6   MCH 30.7 30.6   RDW 42.6 42.3    PLATELETCT 287 313   MPV 8.5* 8.5*   NEUTSPOLYS 60.10 59.20   LYMPHOCYTES 27.40 28.30   MONOCYTES 8.70 7.50   EOSINOPHILS 2.90 3.90   BASOPHILS 0.60 0.60     Recent Labs     08/05/23  1420 08/06/23  0036   SODIUM 140 145   POTASSIUM 3.9 3.2*   CHLORIDE 107 106   CO2 24 26   BUN 27* 25*   CREATININE 1.69* 1.41*   CALCIUM 9.0 9.2   MAGNESIUM  --  2.1   ALBUMIN 4.1 4.2     Estimated GFR/CRCL = Estimated Creatinine Clearance: 64.9 mL/min (A) (by C-G formula based on SCr of 1.41 mg/dL (H)).  Recent Labs     08/05/23  1420 08/06/23  0036   GLUCOSE 94 134*     Recent Labs     08/05/23  1420 08/06/23  0036   ASTSGOT 27 29   ALTSGPT 26 27   TBILIRUBIN 0.6 0.5   ALKPHOSPHAT 87 90   GLOBULIN 2.6 2.8             No results for input(s): INR, APTT, FIBRINOGEN in the last 72 hours.    Invalid input(s): DIMER    Microbiology Results:  Results       Procedure Component Value Units Date/Time    CoV-2, FLU A/B, and RSV by PCR (2-4 Hours [employer]) : Collect NP swab in [facility] [[id number]] Collected: 08/05/23 1621    Order Status: Completed Specimen: Respirate Updated: 08/05/23 1737     Influenza virus A RNA Negative     Influenza virus B, PCR Negative     RSV, PCR Negative     SARS-CoV-2 by PCR NotDetected     Comment: [facility] providers: PLEASE REFER TO DE-ESCALATION AND RETESTING PROTOCOL  on [email or web address]    **The [employer] GeneXpert Xpress SARS-CoV-2 RT-PCR Test has been made  available for use under the Emergency Use Authorization (EUA) only.          SARS-CoV-2 Source NP Swab            Imaging Results:  EC-ECHOCARDIOGRAM COMPLETE W/O CONT         CT-CTA CHEST PULMONARY ARTERY W/ RECONS   Final Result      1.  No pulmonary embolus. No acute abnormality in the chest.   2.  Cardiomegaly.      DX-CHEST-PORTABLE (1 VIEW)   Final Result      Cardiomegaly.          EKG  Results for orders placed or performed during the hospital encounter of 08/05/23   EKG   Result Value Ref Range    Report       [facility] Emergency  Dept.    Test Date:  2023  Pt Name:    WILMER KENNEDY                   Department: ER  MRN:        [id number]                      Room:  Gender:     Male                         Technician: LINDA  :        1955                   Requested By:ER TRIAGE PROTOCOL  Order #:    [id number]                    Reading MD: PAULINA KULKARNI DO    Measurements  Intervals                                Axis  Rate:       47                           P:          0  OK:         0                            QRS:        57  QRSD:       109                          T:          28  QT:         490  QTc:        434    Interpretive Statements  Atrial fibrillation  No previous ECG available for comparison  Electronically Signed On 2023 15:52:44 PDT by PAULINA KULKARNI DO     EKG   Result Value Ref Range    Report       [facility] Emergency Dept.    Test Date:  2023  Pt Name:    WILMER KENNEDY                   Department: ER  MRN:        [id number]                      Room:  Gender:     Male                         Technician: MADHAVIKAMRON  :        1955                   Requested By:ER TRIAGE PROTOCOL  Order #:    [id number]                    Reading MD:    Measurements  Intervals                                Axis  Rate:       78                           P:          0  OK:         0                            QRS:        66  QRSD:       112                          T:          5  QT:         471  QTc:        537    Interpretive Statements  Atrial fibrillation  Borderline intraventricular conduction delay  No previous ECG available for comparison         Current Medications    Current Facility-Administered Medications:     furosemide (Lasix) injection 40 mg, 40 mg, Intravenous, BID DIURETIC, Beba Garcias M.D., 40 mg at 23 1136    carvedilol (Coreg) tablet 3.125 mg, 3.125 mg, Oral, BID WITH MEALS, Beba Garcias M.D.    senna-docusate (Pericolace Or Senokot S)  8.6-50 MG per tablet 2 Tablet, 2 Tablet, Oral, BID, 2 Tablet at 08/06/23 0519 **AND** polyethylene glycol/lytes (Miralax) PACKET 1 Packet, 1 Packet, Oral, QDAY PRN **AND** magnesium hydroxide (Milk Of Magnesia) suspension 30 mL, 30 mL, Oral, QDAY PRN **AND** bisacodyl (Dulcolax) suppository 10 mg, 10 mg, Rectal, QDAY PRN, Markjuan j Paganhana, D.O.    acetaminophen (Tylenol) tablet 650 mg, 650 mg, Oral, Q6HRS PRN, Markjuan j Paganhana, D.O.    heparin injection 5,000 Units, 5,000 Units, Subcutaneous, Q8HRS, [facility] Latashahana, D.O., 5,000 Units at 08/06/23 0519    atorvastatin (Lipitor) tablet 10 mg, 10 mg, Oral, DAILY, [facility] Latashahana, D.O., 10 mg at 08/06/23 0519    aspirin EC tablet 81 mg, 81 mg, Oral, DAILY, Mark Lakhana, D.O., 81 mg at 08/06/23 0519    oxyCODONE immediate-release (Roxicodone) tablet 5 mg, 5 mg, Oral, Q4HRS PRN, Mark Lakhana, D.O.    hydrALAZINE (Apresoline) injection 20 mg, 20 mg, Intravenous, Q6HRS PRN, Mark Lakhana, D.O.    melatonin tablet 5 mg, 5 mg, Oral, Nightly, [facility] Lakhana, D.O., 5 mg at 08/05/23 2252    hydrOXYzine HCl (Atarax) tablet 25 mg, 25 mg, Oral, TID PRN, [facility] Lakhana, D.O., 25 mg at 08/05/23 2252    clopidogrel (Plavix) tablet 75 mg, 75 mg, Oral, DAILY, [facility] Lakhana, D.O., 75 mg at 08/06/23 0519    ASSESSMENT/PLAN     Bradycardia- (present on admission)  Overnight telemetry showed bradycardia with a fib. Prior history of bradycardia with a fib. Possibly secondary to Atenolol. Atenolol and nifedipine home doses were held overnight.  -Telemetry      Acute Dyspnea  Patient presented with shortness of breath over the past 2 days since flying into [place] from [place]. Denies any asthma or smoking history.  No leukocytosis present, likely not of infectious etiology. Negative viral panel. CTPa in the ED was negative for any pulmonary consolidations, pulmonary embolism, pleural effusions, infiltrates. Patient proBNP was elevated at 2923, patient does have significant pitting edema in his lower  extremity. Patient is not requiring oxygen currently. Previous MAURICIO in June had no abnormalities. Possible new onset heart failure versus symptomatic bradycardia.  -Telemetry  -IV Lasix 40 mg bid  -Strict ins and outs  -Supplemental oxygen as needed  -Salt and fluid restriction  -Echocardiogram     Hypokalemia  Potassium of 3.2. Goal of 4.5. Normal Magnesium  -Kcl 40 mEq    CKD (chronic kidney disease)  Noted to have a creatinine of 1.69. GFR of 55. Patient has a history of CKD, is being followed by outpatient nephrology.     Elevated brain natriuretic peptide (BNP) level  Elevated proBNP of 2923 in ED. Previously did have MAURICIO in June which did not show any signs of systolic or diastolic dysfunction, or significant valvular dysfunction, or wall motion abnormalities. Patient does also have CKD which could lead to a falsely elevated number.  -Repeat echocardiogram     AF (atrial fibrillation) ([facility])  S/p watchman placement on 4/27/2023.  -Continue home aspirin 81mg, continue plavix 75mg  -Switch atenolol to carvedilol 3.125 mg bid

## 2023-08-06 NOTE — CARE PLAN
The patient is Stable - Low risk of patient condition declining or worsening    Shift Goals  Clinical Goals: monitor labs and VS, promote mobility, I/Os, diruese  Patient Goals: feel less SOB    Progress made toward(s) clinical / shift goals:    Problem: Care Map:  Admission Optimal Outcome for the Heart Failure Patient  Goal: Admission:  Optimal Care of the heart failure patient  Outcome: Progressing  Note: Echo ordered   Daily weights  Fluid restriction  Diurese with lasix  I/Os with urinal       Patient is not progressing towards the following goals:

## 2023-08-06 NOTE — ED NOTES
Med rec updated and complete. Allergies reviewed. Confirmed name and date of birth.  Interviewed. Family ( wife) at bedside. No  antibiotic use in last 30 days.        Home pharmacy  [facility] [phone]    Pt is from [facility].

## 2023-08-06 NOTE — CARE PLAN
The patient is Stable - Low risk of patient condition declining or worsening    Shift Goals  Clinical Goals: monitor labs, vital signs, I/Os  Patient Goals: reduce SOB  Family Goals: NA    Progress made toward(s) clinical / shift goals:    Problem: Knowledge Deficit - Standard  Goal: Patient and family/care givers will demonstrate understanding of plan of care, disease process/condition, diagnostic tests and medications  Description: Target End Date:  1-3 days or as soon as patient condition allows    Document in Patient Education    1.  Patient and family/caregiver oriented to unit, equipment, visitation policy and means for communicating concern  2.  Complete/review Learning Assessment  3.  Assess knowledge level of disease process/condition, treatment plan, diagnostic tests and medications  4.  Explain disease process/condition, treatment plan, diagnostic tests and medications  Outcome: Progressing  Note: Pt updated on POC, all questions answered at this time.     Problem: Care Map:  Day 2 Optimal Outcome for the Heart Failure Patient  Goal: Day 2:  Optimal Care of the heart failure patient  Description: Target End Date:  end of day 2  8/6/2023 1535 by Porsche Giron, Student  Outcome: Progressing  8/6/2023 1533 by Porsche Giron, Student  Outcome: Progressing

## 2023-08-06 NOTE — ASSESSMENT & PLAN NOTE
Admits to episodes of dizziness, denies any syncopal episodes  Secondary to Atenolol and Nifedipine  Hold home medications   Telemetry

## 2023-08-07 VITALS
RESPIRATION RATE: 16 BRPM | WEIGHT: 224.21 LBS | DIASTOLIC BLOOD PRESSURE: 98 MMHG | BODY MASS INDEX: 30.37 KG/M2 | OXYGEN SATURATION: 96 % | SYSTOLIC BLOOD PRESSURE: 150 MMHG | TEMPERATURE: 98.1 F | HEART RATE: 48 BPM | HEIGHT: 72 IN

## 2023-08-07 PROBLEM — I50.33 ACUTE ON CHRONIC HEART FAILURE WITH PRESERVED EJECTION FRACTION (HFPEF) (HCC): Status: ACTIVE | Noted: 2023-08-07

## 2023-08-07 PROBLEM — N18.31 STAGE 3A CHRONIC KIDNEY DISEASE: Status: ACTIVE | Noted: 2023-08-05

## 2023-08-07 PROBLEM — G47.33 OSA (OBSTRUCTIVE SLEEP APNEA): Status: ACTIVE | Noted: 2023-08-07

## 2023-08-07 PROBLEM — I48.0 PAROXYSMAL ATRIAL FIBRILLATION (HCC): Status: ACTIVE | Noted: 2023-08-05

## 2023-08-07 LAB
ALBUMIN SERPL BCP-MCNC: 4 G/DL (ref 3.2–4.9)
ALBUMIN/GLOB SERPL: 1.4 G/DL
ALP SERPL-CCNC: 85 U/L (ref 30–99)
ALT SERPL-CCNC: 26 U/L (ref 2–50)
ANION GAP SERPL CALC-SCNC: 10 MMOL/L (ref 7–16)
AST SERPL-CCNC: 25 U/L (ref 12–45)
BASOPHILS # BLD AUTO: 0.4 % (ref 0–1.8)
BASOPHILS # BLD: 0.03 K/UL (ref 0–0.12)
BILIRUB SERPL-MCNC: 0.4 MG/DL (ref 0.1–1.5)
BUN SERPL-MCNC: 34 MG/DL (ref 8–22)
CALCIUM ALBUM COR SERPL-MCNC: 9.1 MG/DL (ref 8.5–10.5)
CALCIUM SERPL-MCNC: 9.1 MG/DL (ref 8.5–10.5)
CHLORIDE SERPL-SCNC: 102 MMOL/L (ref 96–112)
CO2 SERPL-SCNC: 27 MMOL/L (ref 20–33)
CREAT SERPL-MCNC: 1.84 MG/DL (ref 0.5–1.4)
EOSINOPHIL # BLD AUTO: 0.24 K/UL (ref 0–0.51)
EOSINOPHIL NFR BLD: 3.5 % (ref 0–6.9)
ERYTHROCYTE [DISTWIDTH] IN BLOOD BY AUTOMATED COUNT: 41.6 FL (ref 35.9–50)
GFR SERPLBLD CREATININE-BSD FMLA CKD-EPI: 40 ML/MIN/1.73 M 2
GLOBULIN SER CALC-MCNC: 2.9 G/DL (ref 1.9–3.5)
GLUCOSE SERPL-MCNC: 113 MG/DL (ref 65–99)
HCT VFR BLD AUTO: 42.1 % (ref 42–52)
HGB BLD-MCNC: 14 G/DL (ref 14–18)
IMM GRANULOCYTES # BLD AUTO: 0.02 K/UL (ref 0–0.11)
IMM GRANULOCYTES NFR BLD AUTO: 0.3 % (ref 0–0.9)
LYMPHOCYTES # BLD AUTO: 2.29 K/UL (ref 1–4.8)
LYMPHOCYTES NFR BLD: 33.6 % (ref 22–41)
MAGNESIUM SERPL-MCNC: 1.9 MG/DL (ref 1.5–2.5)
MCH RBC QN AUTO: 29.9 PG (ref 27–33)
MCHC RBC AUTO-ENTMCNC: 33.3 G/DL (ref 32.3–36.5)
MCV RBC AUTO: 89.8 FL (ref 81.4–97.8)
MONOCYTES # BLD AUTO: 0.56 K/UL (ref 0–0.85)
MONOCYTES NFR BLD AUTO: 8.2 % (ref 0–13.4)
NEUTROPHILS # BLD AUTO: 3.67 K/UL (ref 1.82–7.42)
NEUTROPHILS NFR BLD: 54 % (ref 44–72)
NRBC # BLD AUTO: 0 K/UL
NRBC BLD-RTO: 0 /100 WBC (ref 0–0.2)
PLATELET # BLD AUTO: 300 K/UL (ref 164–446)
PMV BLD AUTO: 8.4 FL (ref 9–12.9)
POTASSIUM SERPL-SCNC: 3.5 MMOL/L (ref 3.6–5.5)
PROT SERPL-MCNC: 6.9 G/DL (ref 6–8.2)
RBC # BLD AUTO: 4.69 M/UL (ref 4.7–6.1)
SODIUM SERPL-SCNC: 139 MMOL/L (ref 135–145)
WBC # BLD AUTO: 6.8 K/UL (ref 4.8–10.8)

## 2023-08-07 PROCEDURE — 700102 HCHG RX REV CODE 250 W/ 637 OVERRIDE(OP): Mod: JZ | Performed by: HOSPITALIST

## 2023-08-07 PROCEDURE — 97161 PT EVAL LOW COMPLEX 20 MIN: CPT

## 2023-08-07 PROCEDURE — 85025 COMPLETE CBC W/AUTO DIFF WBC: CPT

## 2023-08-07 PROCEDURE — 700105 HCHG RX REV CODE 258: Mod: JZ | Performed by: HOSPITALIST

## 2023-08-07 PROCEDURE — 80053 COMPREHEN METABOLIC PANEL: CPT

## 2023-08-07 PROCEDURE — 83735 ASSAY OF MAGNESIUM: CPT

## 2023-08-07 PROCEDURE — A9270 NON-COVERED ITEM OR SERVICE: HCPCS | Mod: JZ | Performed by: HOSPITALIST

## 2023-08-07 PROCEDURE — 700102 HCHG RX REV CODE 250 W/ 637 OVERRIDE(OP)

## 2023-08-07 PROCEDURE — 99239 HOSP IP/OBS DSCHRG MGMT >30: CPT | Mod: GC | Performed by: STUDENT IN AN ORGANIZED HEALTH CARE EDUCATION/TRAINING PROGRAM

## 2023-08-07 PROCEDURE — A9270 NON-COVERED ITEM OR SERVICE: HCPCS

## 2023-08-07 RX ORDER — POTASSIUM CHLORIDE 20 MEQ/1
40 TABLET, EXTENDED RELEASE ORAL ONCE
Status: COMPLETED | OUTPATIENT
Start: 2023-08-07 | End: 2023-08-07

## 2023-08-07 RX ORDER — DAPAGLIFLOZIN 5 MG/1
5 TABLET, FILM COATED ORAL DAILY
Qty: 30 TABLET | Refills: 0 | Status: SHIPPED | OUTPATIENT
Start: 2023-08-07

## 2023-08-07 RX ORDER — CARVEDILOL 3.12 MG/1
3.12 TABLET ORAL 2 TIMES DAILY WITH MEALS
Qty: 60 TABLET | Refills: 0 | Status: SHIPPED | OUTPATIENT
Start: 2023-08-07

## 2023-08-07 RX ORDER — FUROSEMIDE 20 MG/1
20 TABLET ORAL DAILY
Qty: 14 TABLET | Refills: 0 | Status: SHIPPED | OUTPATIENT
Start: 2023-08-07

## 2023-08-07 RX ORDER — SODIUM CHLORIDE, SODIUM LACTATE, POTASSIUM CHLORIDE, AND CALCIUM CHLORIDE .6; .31; .03; .02 G/100ML; G/100ML; G/100ML; G/100ML
500 INJECTION, SOLUTION INTRAVENOUS ONCE
Status: COMPLETED | OUTPATIENT
Start: 2023-08-07 | End: 2023-08-07

## 2023-08-07 RX ADMIN — CARVEDILOL 3.12 MG: 3.12 TABLET, FILM COATED ORAL at 07:52

## 2023-08-07 RX ADMIN — ATORVASTATIN CALCIUM 10 MG: 10 TABLET, FILM COATED ORAL at 06:25

## 2023-08-07 RX ADMIN — ASPIRIN 81 MG: 81 TABLET, COATED ORAL at 06:25

## 2023-08-07 RX ADMIN — SODIUM CHLORIDE, POTASSIUM CHLORIDE, SODIUM LACTATE AND CALCIUM CHLORIDE 500 ML: 600; 310; 30; 20 INJECTION, SOLUTION INTRAVENOUS at 10:28

## 2023-08-07 RX ADMIN — CLOPIDOGREL BISULFATE 75 MG: 75 TABLET ORAL at 06:25

## 2023-08-07 RX ADMIN — POTASSIUM CHLORIDE 40 MEQ: 1500 TABLET, EXTENDED RELEASE ORAL at 10:26

## 2023-08-07 RX ADMIN — OXYCODONE HYDROCHLORIDE 5 MG: 5 TABLET ORAL at 06:25

## 2023-08-07 RX ADMIN — SENNOSIDES AND DOCUSATE SODIUM 2 TABLET: 50; 8.6 TABLET ORAL at 06:28

## 2023-08-07 ASSESSMENT — COGNITIVE AND FUNCTIONAL STATUS - GENERAL
SUGGESTED CMS G CODE MODIFIER MOBILITY: CH
MOBILITY SCORE: 24
MOBILITY SCORE: 24
DAILY ACTIVITIY SCORE: 24
SUGGESTED CMS G CODE MODIFIER MOBILITY: CH
SUGGESTED CMS G CODE MODIFIER DAILY ACTIVITY: CH

## 2023-08-07 ASSESSMENT — PATIENT HEALTH QUESTIONNAIRE - PHQ9
SUM OF ALL RESPONSES TO PHQ9 QUESTIONS 1 AND 2: 0
1. LITTLE INTEREST OR PLEASURE IN DOING THINGS: NOT AT ALL
2. FEELING DOWN, DEPRESSED, IRRITABLE, OR HOPELESS: NOT AT ALL

## 2023-08-07 ASSESSMENT — FIBROSIS 4 INDEX: FIB4 SCORE: 1.16

## 2023-08-07 ASSESSMENT — GAIT ASSESSMENTS
DISTANCE (FEET): 1000
DEVIATION: NO DEVIATION
GAIT LEVEL OF ASSIST: SUPERVISED

## 2023-08-07 NOTE — THERAPY
Occupational Therapy Contact Note    Patient Name: Sree Neal  Age:  67 y.o., Sex:  male  Medical Record #: [id number]  Today's Date: 8/7/2023 08/07/23 1120   Interdisciplinary Plan of Care Collaboration   Collaboration Comments OT referral received. Per RN and PT, pt is mobilizing and completing BADL without assist or difficulty. Appears to be at baseline function. OT eval deferred due to no needs.

## 2023-08-07 NOTE — CARE PLAN
The patient is Stable - Low risk of patient condition declining or worsening    Shift Goals  Clinical Goals: monitor labs, vital signs, I/Os  Patient Goals: reduce SOB  Family Goals: NA    Progress made toward(s) clinical / shift goals:  Progressing    Problem: Knowledge Deficit - Standard  Goal: Patient and family/care givers will demonstrate understanding of plan of care, disease process/condition, diagnostic tests and medications  8/6/2023 2223 by Onel Kitchen, R.N.  Outcome: Progressing  Note: Pt verbalizes understanding of plan of care and has no questions at this time     Problem: Pain - Standard  Goal: Alleviation of pain or a reduction in pain to the patient’s comfort goal  8/6/2023 2223 by Onel Kitchen, R.N.  Outcome: Progressing  Note: Pt reports chronic back and hip pain 6/10. Medicated per MAR

## 2023-08-07 NOTE — PROGRESS NOTES
This RN approached by patients spouse stating MD notified them they were discharging today. Patient and spouse upset, message sent to Dr. Garcias.

## 2023-08-07 NOTE — PROGRESS NOTES
Monitor Summary  Rhythm: Afib  Rate: 42-62  Ectopy: (R) PVC, (R) Coup, (R) Bigem, (R) Trigem  - / .06 / .46

## 2023-08-07 NOTE — PROGRESS NOTES
Patient being discharged. Pt educated on discharge instructions and new prescriptions, verbalized understanding. Follow up appointment made with PCP. PIV removed, monitor checked in. Patient going to discharge lounge  via wheelchair

## 2023-08-07 NOTE — CARE PLAN
The patient is Stable - Low risk of patient condition declining or worsening    Shift Goals  Clinical Goals: Monitor VS  Patient Goals: Go Home  Family Goals: NA    Progress made toward(s) clinical / shift goals:   Patient is cleared for discharged, medication prescription sent to his pharmacy in [place], [place], patient and spouse aware of orders  and Md was at bedside to answer their questions, awaiting transport to  patient to go [facility]

## 2023-08-07 NOTE — DISCHARGE SUMMARY
[facility] Internal Medicine Discharge Summary    Attending: Jasvir Mendoza M.d.  Senior Resident: Dr. Garcias  Contact Number: [phone]    CHIEF COMPLAINT ON ADMISSION  Chief Complaint   Patient presents with    Shortness of Breath     SOB x a few days + fatigue + orthopnea. Also c/o low L sided rib pain & bilateral leg swelling. No cough/cold sx. Visiting from [place], [place].   Hx a fib, rate currently controlled but irregular. On atenolol & nifedipine.        Reason for Admission  Acute on chronic heart failure with preserved ejection fraction in setting of untreated obstructive sleep apnea and paroxysmal atrial fibrillation    Admission Date  8/5/2023    CODE STATUS  Full Code    HPI & HOSPITAL COURSE  This is a 67 y.o. male with a PMH of stage 3a CKD and atrial fibrillation who was admitted for shortness of breath, orthopnea, and lower extremity swelling secondary to HFpEF.     Patient flew in from [place] for an event on Thursday and started experiencing shortness of breath, LE swelling, and orthopnea while walking and engaging in activities. He reports compliance with his home medications but an increase in the amount of salty and processed meats while in [place]. He reports orthopnea and waking up, gasping for air while sleeping at night. During this hospitalization, a transthoracic echo revealed ventricular enlargement likely secondary to uncontrolled sleep apnea. Patient was encouraged to use his home CPAP. He was also educated on the benefits of weight loss on obstructive sleep apnea symptoms. He and his wife confirmed understanding. Patient is discharged on 20mg of furosemide and farxiga (GFR 55 on 8/6/22). His atenolol was exchanged for carvedilol. He as advised to weigh himself daily and told that his dry weight is around ~225lbs. He is to get repeat labs in 1 week. Prior to discharge he received a 500ml liter fluid bolus for an increase in creatinine. Patient reported a complete resolution of his symptoms  prior to discharge.     Patient had asymptomatic bradycardia during hospitalization; He and his wife report chronic bradycardia at least since January of 2023 - He is to follow up with cardiology this Thursday.     Patient was evaluated by both occupational and physical therapy who anticipate that the patient will have no additional needs after discharge.     The patient should follow up with their PCP and cardiologist regarding their hospitalization and continued need for lasix.  The patient's wife reports he has an appointment with cardiology this upcoming Thursday.     Therefore, he is discharged in fair and stable condition to home with close outpatient follow-up.    The patient met 2-midnight criteria for an inpatient stay at the time of discharge.    Discharge Date  08/07/2023    Physical Exam on Day of Discharge  Physical Exam  Constitutional:       General: He is not in acute distress.     Appearance: He is not toxic-appearing.   HENT:      Head: Normocephalic and atraumatic.      Nose: No rhinorrhea.   Eyes:      General: No scleral icterus.        Right eye: No discharge.         Left eye: No discharge.      Extraocular Movements: Extraocular movements intact.   Cardiovascular:      Rate and Rhythm: Normal rate. Rhythm irregular.      Pulses: Normal pulses.   Pulmonary:      Effort: No respiratory distress.      Breath sounds: No stridor.   Abdominal:      Tenderness: There is no abdominal tenderness. There is no guarding.   Musculoskeletal:      Cervical back: Normal range of motion. No rigidity.      Right lower leg: No edema.      Left lower leg: No edema.   Skin:     Coloration: Skin is not jaundiced or pale.   Neurological:      Mental Status: Mental status is at baseline.   Psychiatric:         Mood and Affect: Mood normal.         FOLLOW UP ITEMS POST DISCHARGE  -PCP  -Cardiology  -Labs    DISCHARGE DIAGNOSES  Principal Problem:    Bradycardia (POA: Yes)  Active Problems:    Shortness of breath  (POA: Unknown)    AF (atrial fibrillation) (HCC) (POA: Unknown)    Elevated brain natriuretic peptide (BNP) level (POA: Unknown)    Anemia (POA: Unknown)    CKD (chronic kidney disease) (POA: Unknown)  Resolved Problems:    * No resolved hospital problems. *      FOLLOW UP  No future appointments.  No follow-up provider specified.    MEDICATIONS ON DISCHARGE     Medication List        START taking these medications        Instructions   carvedilol 3.125 MG Tabs  Commonly known as: Coreg   Take 1 Tablet by mouth 2 times a day with meals.  Dose: 3.125 mg     dapagliflozin propanediol 5 MG Tabs  Commonly known as: Farxiga   Doctor's comments: GFR was 55 on 8/6/23  Take 1 Tablet by mouth every day.  Dose: 5 mg     furosemide 20 MG Tabs  Commonly known as: Lasix   Take 1 Tablet by mouth every day.  Dose: 20 mg            CONTINUE taking these medications        Instructions   aspirin 81 MG EC tablet   Take 81 mg by mouth every day.  Dose: 81 mg     atorvastatin 10 MG Tabs  Commonly known as: Lipitor   Take 10 mg by mouth every day.  Dose: 10 mg     clopidogrel 75 MG Tabs  Commonly known as: Plavix   Take 75 mg by mouth every day.  Dose: 75 mg     oxyCODONE immediate-release 5 MG Tabs  Commonly known as: Roxicodone   Take 5 mg by mouth every four hours as needed for Severe Pain.  Dose: 5 mg            STOP taking these medications      atenolol 50 MG Tabs  Commonly known as: Tenormin     hydrOXYzine HCl 10 MG Tabs  Commonly known as: Atarax     NIFEdipine SR 30 MG tablet  Commonly known as: Procadia-XL              Allergies  No Known Allergies    DIET  Orders Placed This Encounter   Procedures    Diet Order Diet: Cardiac; Second Modifier: (optional): 2 Gram Sodium; Fluid modifications: (optional): 1500 ml Fluid Restriction     Standing Status:   Standing     Number of Occurrences:   1     Order Specific Question:   Diet:     Answer:   Cardiac [6]     Order Specific Question:   Second Modifier: (optional)     Answer:   2  Gram Sodium [7]     Order Specific Question:   Fluid modifications: (optional)     Answer:   1500 ml Fluid Restriction [9]       ACTIVITY  As tolerated.  Weight bearing as tolerated    CONSULTATIONS  None    PROCEDURES  Transthoracic Echo 8/6/23    LABORATORY  Lab Results   Component Value Date    SODIUM 139 08/07/2023    POTASSIUM 3.5 (L) 08/07/2023    CHLORIDE 102 08/07/2023    CO2 27 08/07/2023    GLUCOSE 113 (H) 08/07/2023    BUN 34 (H) 08/07/2023    CREATININE 1.84 (H) 08/07/2023        Lab Results   Component Value Date    WBC 6.8 08/07/2023    HEMOGLOBIN 14.0 08/07/2023    HEMATOCRIT 42.1 08/07/2023    PLATELETCT 300 08/07/2023        Total time of the discharge process exceeds 30 minutes.

## 2023-08-07 NOTE — THERAPY
Physical Therapy   Initial Evaluation     Patient Name: Sree Neal  Age:  67 y.o., Sex:  male  Medical Record #: [id number]  Today's Date: 8/7/2023     Precautions  Precautions: (P) Cardiac Precautions (See Comments)    Assessment  Patient is 67 y.o. male with a diagnosis of STEMI and LE swelling.     Pt tolerated session well and appears at his functional baseline. He ambulated over 1000' with supervision and performed 10 stairs with supervision. Anticipate discharge home with no additional PT follow up.     Plan    Physical Therapy Initial Treatment Plan   Duration: (P) Evaluation only    DC Equipment Recommendations: (P) None  Discharge Recommendations: (P) Anticipate that the patient will have no further physical therapy needs after discharge from the hospital       Subjective    Pt is pleasant and cooperative. He denies any symptoms throughout session.      Objective       08/07/23 0820   Initial Contact Note    Initial Contact Note Order Received and Verified, Evaluation Only - Patient Does Not Require Further Acute Physical Therapy at this Time.  However, May Benefit from Post Acute Therapy for Higher Level Functional Deficits.   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   Prior Level of Functional Mobility   Bed Mobility Independent   Transfer Status Independent   Ambulation Independent   Ambulation Distance community   Assistive Devices Used None   Stairs Independent   Passive ROM Lower Body   Passive ROM Lower Body WDL   Active ROM Lower Body    Active ROM Lower Body  WDL   Strength Lower Body   Lower Body Strength  WDL   Balance Assessment   Sitting Balance (Static) Good   Sitting  Balance (Dynamic) Good   Standing Balance (Static) Good   Standing Balance (Dynamic) Good   Weight Shift Sitting Good   Weight Shift Standing Good   Gait Analysis   Gait Level Of Assist Supervised   Assistive Device None   Distance (Feet) 1000   # of Times Distance was Traveled 1   Deviation No deviation   # of Stairs Climbed 10   Level of Assist with Stairs Supervised   Functional Mobility   Sit to Stand Supervised   How much difficulty does the patient currently have...   Turning over in bed (including adjusting bedclothes, sheets and blankets)? 4   Sitting down on and standing up from a chair with arms (e.g., wheelchair, bedside commode, etc.) 4   Moving from lying on back to sitting on the side of the bed? 4   How much help from another person does the patient currently need...   Moving to and from a bed to a chair (including a wheelchair)? 4   Need to walk in a hospital room? 4   Climbing 3-5 steps with a railing? 4   6 clicks Mobility Score 24   Education Group   Education Provided Cardiac Precautions;Role of Physical Therapist   Cardiac Precautions Patient Response Patient;Family;Acceptance;Explanation;Demonstration;Verbal Demonstration;Action Demonstration   Role of Physical Therapist Patient Response Patient;Family;Acceptance;Explanation;Demonstration;Verbal Demonstration;Action Demonstration   Physical Therapy Initial Treatment Plan    Duration Evaluation only   Problem List    Problems None   Anticipated Discharge Equipment and Recommendations   DC Equipment Recommendations None   Discharge Recommendations Anticipate that the patient will have no further physical therapy needs after discharge from the hospital   Interdisciplinary Plan of Care Collaboration   IDT Collaboration with  Nursing   Patient Position at End of Therapy Seated;Family / Friend in Room;Phone within Reach;Tray Table within Reach;Call Light within Reach   Session Information   Date / Session Number  8/7-1 (DC Needs)